# Patient Record
Sex: FEMALE | Race: WHITE | HISPANIC OR LATINO | Employment: UNEMPLOYED | URBAN - METROPOLITAN AREA
[De-identification: names, ages, dates, MRNs, and addresses within clinical notes are randomized per-mention and may not be internally consistent; named-entity substitution may affect disease eponyms.]

---

## 2019-02-23 ENCOUNTER — HOSPITAL ENCOUNTER (EMERGENCY)
Facility: HOSPITAL | Age: 69
Discharge: HOME | End: 2019-02-23
Attending: EMERGENCY MEDICINE
Payer: COMMERCIAL

## 2019-02-23 VITALS
SYSTOLIC BLOOD PRESSURE: 148 MMHG | TEMPERATURE: 98 F | OXYGEN SATURATION: 96 % | DIASTOLIC BLOOD PRESSURE: 65 MMHG | HEIGHT: 60 IN | BODY MASS INDEX: 28.99 KG/M2 | WEIGHT: 147.69 LBS | RESPIRATION RATE: 20 BRPM | HEART RATE: 92 BPM

## 2019-02-23 DIAGNOSIS — R10.9 ABDOMINAL PAIN: ICD-10-CM

## 2019-02-23 DIAGNOSIS — E86.1 INTRAVASCULAR VOLUME DEPLETION: ICD-10-CM

## 2019-02-23 DIAGNOSIS — E87.6 HYPOKALEMIA: ICD-10-CM

## 2019-02-23 DIAGNOSIS — R19.7 DIARRHEA, UNSPECIFIED TYPE: Primary | ICD-10-CM

## 2019-02-23 LAB
ALBUMIN SERPL BCP-MCNC: 4.5 G/DL
ALP SERPL-CCNC: 75 U/L
ALT SERPL W/O P-5'-P-CCNC: 72 U/L
ANION GAP SERPL CALC-SCNC: 16 MMOL/L
AST SERPL-CCNC: 58 U/L
BASOPHILS # BLD AUTO: 0.03 K/UL
BASOPHILS NFR BLD: 0.3 %
BILIRUB SERPL-MCNC: 0.3 MG/DL
BILIRUB UR QL STRIP: NEGATIVE
BUN SERPL-MCNC: 26 MG/DL
CALCIUM SERPL-MCNC: 10.1 MG/DL
CHLORIDE SERPL-SCNC: 98 MMOL/L
CLARITY UR: CLEAR
CO2 SERPL-SCNC: 25 MMOL/L
COLOR UR: YELLOW
CREAT SERPL-MCNC: 1.2 MG/DL
DIFFERENTIAL METHOD: ABNORMAL
EOSINOPHIL # BLD AUTO: 0.1 K/UL
EOSINOPHIL NFR BLD: 1 %
ERYTHROCYTE [DISTWIDTH] IN BLOOD BY AUTOMATED COUNT: 13.1 %
EST. GFR  (AFRICAN AMERICAN): 54 ML/MIN/1.73 M^2
EST. GFR  (NON AFRICAN AMERICAN): 47 ML/MIN/1.73 M^2
GLUCOSE SERPL-MCNC: 191 MG/DL
GLUCOSE UR QL STRIP: NEGATIVE
HCT VFR BLD AUTO: 42 %
HGB BLD-MCNC: 14.7 G/DL
HGB UR QL STRIP: NEGATIVE
KETONES UR QL STRIP: NEGATIVE
LEUKOCYTE ESTERASE UR QL STRIP: NEGATIVE
LIPASE SERPL-CCNC: 60 U/L
LYMPHOCYTES # BLD AUTO: 1.8 K/UL
LYMPHOCYTES NFR BLD: 16.7 %
MAGNESIUM SERPL-MCNC: 2 MG/DL
MCH RBC QN AUTO: 30.9 PG
MCHC RBC AUTO-ENTMCNC: 35 G/DL
MCV RBC AUTO: 88 FL
MONOCYTES # BLD AUTO: 0.3 K/UL
MONOCYTES NFR BLD: 3 %
NEUTROPHILS # BLD AUTO: 8.3 K/UL
NEUTROPHILS NFR BLD: 79 %
NITRITE UR QL STRIP: NEGATIVE
PH UR STRIP: 6 [PH] (ref 5–8)
PLATELET # BLD AUTO: 259 K/UL
PMV BLD AUTO: 10.9 FL
POCT GLUCOSE: 202 MG/DL (ref 70–110)
POTASSIUM SERPL-SCNC: 3.2 MMOL/L
PROT SERPL-MCNC: 8 G/DL
PROT UR QL STRIP: NEGATIVE
RBC # BLD AUTO: 4.75 M/UL
SODIUM SERPL-SCNC: 139 MMOL/L
SP GR UR STRIP: <=1.005 (ref 1–1.03)
URN SPEC COLLECT METH UR: ABNORMAL
UROBILINOGEN UR STRIP-ACNC: NEGATIVE EU/DL
WBC # BLD AUTO: 10.51 K/UL

## 2019-02-23 PROCEDURE — 83690 ASSAY OF LIPASE: CPT

## 2019-02-23 PROCEDURE — 81003 URINALYSIS AUTO W/O SCOPE: CPT

## 2019-02-23 PROCEDURE — 85025 COMPLETE CBC W/AUTO DIFF WBC: CPT

## 2019-02-23 PROCEDURE — 82962 GLUCOSE BLOOD TEST: CPT

## 2019-02-23 PROCEDURE — 93010 ELECTROCARDIOGRAM REPORT: CPT | Mod: ,,, | Performed by: INTERNAL MEDICINE

## 2019-02-23 PROCEDURE — 86803 HEPATITIS C AB TEST: CPT

## 2019-02-23 PROCEDURE — 93010 EKG 12-LEAD: ICD-10-PCS | Mod: ,,, | Performed by: INTERNAL MEDICINE

## 2019-02-23 PROCEDURE — 83735 ASSAY OF MAGNESIUM: CPT

## 2019-02-23 PROCEDURE — 99285 EMERGENCY DEPT VISIT HI MDM: CPT | Mod: 25

## 2019-02-23 PROCEDURE — 93005 ELECTROCARDIOGRAM TRACING: CPT

## 2019-02-23 PROCEDURE — 96360 HYDRATION IV INFUSION INIT: CPT

## 2019-02-23 PROCEDURE — 80053 COMPREHEN METABOLIC PANEL: CPT

## 2019-02-23 PROCEDURE — 25000003 PHARM REV CODE 250: Performed by: EMERGENCY MEDICINE

## 2019-02-23 RX ORDER — METFORMIN HYDROCHLORIDE 1000 MG/1
1000 TABLET ORAL 2 TIMES DAILY WITH MEALS
COMMUNITY

## 2019-02-23 RX ORDER — HYDROCHLOROTHIAZIDE 50 MG/1
50 TABLET ORAL DAILY
COMMUNITY

## 2019-02-23 RX ORDER — CARVEDILOL 6.25 MG/1
6.25 TABLET ORAL 2 TIMES DAILY WITH MEALS
COMMUNITY

## 2019-02-23 RX ORDER — LOPERAMIDE HYDROCHLORIDE 2 MG/1
2 CAPSULE ORAL 4 TIMES DAILY PRN
Qty: 12 CAPSULE | Refills: 0 | Status: SHIPPED | OUTPATIENT
Start: 2019-02-23 | End: 2019-03-05

## 2019-02-23 RX ORDER — AMLODIPINE BESYLATE 10 MG/1
10 TABLET ORAL DAILY
COMMUNITY

## 2019-02-23 RX ADMIN — SODIUM CHLORIDE 1000 ML: 0.9 INJECTION, SOLUTION INTRAVENOUS at 06:02

## 2019-02-23 NOTE — ED PROVIDER NOTES
SCRIBE #1 NOTE: I, Margot Gomez, am scribing for, and in the presence of, Sharmin Hopkins MD. I have scribed the entire note.       History     Chief Complaint   Patient presents with    Abdominal Pain     abdominal and epigastric pain, diarrhea x2 days     Review of patient's allergies indicates:  No Known Allergies      History of Present Illness     HPI    2/23/2019, 5:40 PM  History obtained from the patient's friend who is translating      History of Present Illness: Flower Reynoso is a 68 y.o. female patient with a PMHx of HTN, DM who presents to the Emergency Department for evaluation of sharp, abdominal pain which onset gradually today. The pain is constant and moderate in severity. No mitigating or exacerbating factors reported. Patient has had diarrhea x2 days and states that she has had plenty of episodes. Friend also reports that patient began having BLE pain today. Pt has no c/o fever, chills, N/V, constipation, blood in stool, melena, dysuria, hematuria, dizziness, lightheadedness, and all other sxs at this time.      Arrival mode: Personal vehicle    PCP: [facility]        Past Medical History:  Past Medical History:   Diagnosis Date    Diabetes mellitus     Hypertension        Past Surgical History:  History reviewed. No pertinent surgical history.      Family History:  History reviewed. No pertinent family history.    Social History:  Social History     Tobacco Use    Smoking status: Never Smoker   Substance and Sexual Activity    Alcohol use: Unknown    Drug use: Unknown    Sexual activity: Not on file        Review of Systems     Review of Systems   Constitutional: Negative for chills and fever.   HENT: Negative for sore throat.    Respiratory: Negative for shortness of breath.    Cardiovascular: Negative for chest pain.   Gastrointestinal: Positive for abdominal pain and diarrhea. Negative for anal bleeding, blood in stool, constipation, nausea and vomiting.    Genitourinary: Negative for dysuria.   Musculoskeletal: Positive for myalgias (BLE pain). Negative for back pain.   Skin: Negative for rash.   Neurological: Negative for dizziness, syncope, weakness and light-headedness.   Hematological: Does not bruise/bleed easily.   All other systems reviewed and are negative.     Physical Exam     Initial Vitals [02/23/19 1720]   BP Pulse Resp Temp SpO2   (!) 183/69 109 20 98.4 °F (36.9 °C) 97 %      MAP       --          Physical Exam  Nursing Notes and Vital Signs Reviewed.  Constitutional: Patient is in no acute distress. Well-developed and well-nourished.  Head: Atraumatic. Normocephalic.  Eyes: PERRL. EOM intact. Conjunctivae are not pale. No scleral icterus.  ENT: Mucous membranes are moist. Oropharynx is clear and symmetric.    Neck: Supple. Full ROM. No lymphadenopathy.  Cardiovascular: Regular rate. Regular rhythm. No murmurs, rubs, or gallops. Distal pulses are 2+ and symmetric.  Pulmonary/Chest: No respiratory distress. Clear to auscultation bilaterally. No wheezing or rales.  Abdominal: Soft and non-distended.  There is no tenderness.  No rebound, guarding, or rigidity. Good bowel sounds.  Musculoskeletal: Moves all extremities. No obvious deformities. No edema. No calf tenderness.  Skin: Warm and dry.  Neurological:  Alert, awake, and appropriate.  Normal speech.  No acute focal neurological deficits are appreciated.  Psychiatric: Normal affect. Good eye contact. Appropriate in content.     ED Course   Procedures  ED Vital Signs:  Vitals:    02/23/19 1720 02/23/19 1901 02/23/19 1931   BP: (!) 183/69 (!) 145/67 (!) 148/65   Pulse: 109 88 92   Resp: 20 20 20   Temp: 98.4 °F (36.9 °C)     TempSrc: Oral     SpO2: 97% 95% 96%   Weight: 67 kg (147 lb 11.3 oz)     Height: 5' (1.524 m)         Abnormal Lab Results:  Labs Reviewed   CBC W/ AUTO DIFFERENTIAL - Abnormal; Notable for the following components:       Result Value    Gran # (ANC) 8.3 (*)     Gran% 79.0 (*)      Lymph% 16.7 (*)     Mono% 3.0 (*)     All other components within normal limits   COMPREHENSIVE METABOLIC PANEL - Abnormal; Notable for the following components:    Potassium 3.2 (*)     Glucose 191 (*)     BUN, Bld 26 (*)     AST 58 (*)     ALT 72 (*)     eGFR if  54 (*)     eGFR if non  47 (*)     All other components within normal limits   URINALYSIS, REFLEX TO URINE CULTURE - Abnormal; Notable for the following components:    Specific Gravity, UA <=1.005 (*)     All other components within normal limits    Narrative:     Preferred Collection Type->Urine, Clean Catch   POCT GLUCOSE - Abnormal; Notable for the following components:    POCT Glucose 202 (*)     All other components within normal limits   LIPASE   MAGNESIUM   HEPATITIS C ANTIBODY        All Lab Results:  Results for orders placed or performed during the hospital encounter of 02/23/19   CBC W/ AUTO DIFFERENTIAL   Result Value Ref Range    WBC 10.51 3.90 - 12.70 K/uL    RBC 4.75 4.00 - 5.40 M/uL    Hemoglobin 14.7 12.0 - 16.0 g/dL    Hematocrit 42.0 37.0 - 48.5 %    MCV 88 82 - 98 fL    MCH 30.9 27.0 - 31.0 pg    MCHC 35.0 32.0 - 36.0 g/dL    RDW 13.1 11.5 - 14.5 %    Platelets 259 150 - 350 K/uL    MPV 10.9 9.2 - 12.9 fL    Gran # (ANC) 8.3 (H) 1.8 - 7.7 K/uL    Lymph # 1.8 1.0 - 4.8 K/uL    Mono # 0.3 0.3 - 1.0 K/uL    Eos # 0.1 0.0 - 0.5 K/uL    Baso # 0.03 0.00 - 0.20 K/uL    Gran% 79.0 (H) 38.0 - 73.0 %    Lymph% 16.7 (L) 18.0 - 48.0 %    Mono% 3.0 (L) 4.0 - 15.0 %    Eosinophil% 1.0 0.0 - 8.0 %    Basophil% 0.3 0.0 - 1.9 %    Differential Method Automated    Comp. Metabolic Panel   Result Value Ref Range    Sodium 139 136 - 145 mmol/L    Potassium 3.2 (L) 3.5 - 5.1 mmol/L    Chloride 98 95 - 110 mmol/L    CO2 25 23 - 29 mmol/L    Glucose 191 (H) 70 - 110 mg/dL    BUN, Bld 26 (H) 8 - 23 mg/dL    Creatinine 1.2 0.5 - 1.4 mg/dL    Calcium 10.1 8.7 - 10.5 mg/dL    Total Protein 8.0 6.0 - 8.4 g/dL    Albumin 4.5 3.5 -  5.2 g/dL    Total Bilirubin 0.3 0.1 - 1.0 mg/dL    Alkaline Phosphatase 75 55 - 135 U/L    AST 58 (H) 10 - 40 U/L    ALT 72 (H) 10 - 44 U/L    Anion Gap 16 8 - 16 mmol/L    eGFR if African American 54 (A) >60 mL/min/1.73 m^2    eGFR if non African American 47 (A) >60 mL/min/1.73 m^2   Lipase   Result Value Ref Range    Lipase 60 4 - 60 U/L   Urinalysis, Reflex to Urine Culture Urine, Clean Catch   Result Value Ref Range    Specimen UA Urine, Clean Catch     Color, UA Yellow Yellow, Straw, Leela    Appearance, UA Clear Clear    pH, UA 6.0 5.0 - 8.0    Specific Gravity, UA <=1.005 (A) 1.005 - 1.030    Protein, UA Negative Negative    Glucose, UA Negative Negative    Ketones, UA Negative Negative    Bilirubin (UA) Negative Negative    Occult Blood UA Negative Negative    Nitrite, UA Negative Negative    Urobilinogen, UA Negative <2.0 EU/dL    Leukocytes, UA Negative Negative   Magnesium   Result Value Ref Range    Magnesium 2.0 1.6 - 2.6 mg/dL   POCT glucose   Result Value Ref Range    POCT Glucose 202 (H) 70 - 110 mg/dL       Imaging Results:  Imaging Results          X-Ray Abdomen Flat And Erect (Final result)  Result time 02/23/19 18:16:51    Final result by Facundo Morrissey MD (02/23/19 18:16:51)                 Impression:      No acute abnormality.      Electronically signed by: Facundo Morrissey MD  Date:    02/23/2019  Time:    18:16             Narrative:    EXAMINATION:  XR ABDOMEN FLAT AND ERECT    CLINICAL HISTORY:  Abdominal Pain;    TECHNIQUE:  Two view of the abdomen was performed.    COMPARISON:  None    FINDINGS:  Nonobstructive bowel gas pattern.  Mild constipation.    No obvious free air.  No portal venous gas.    No acute fracture. Mild DJD. Lung bases are clear.                               The EKG was ordered, reviewed, and independently interpreted by the ED provider.  Interpretation time: 17:26  Rate: 105 BPM  Rhythm: sinus tachycardia  Interpretation: RBBB. No STEMI.       The Emergency Provider  reviewed the vital signs and test results, which are outlined above.     ED Discussion     7:31 PM: Reassessed pt at this time. Patient's friend is translating for her. Discussed with pt all pertinent ED information and results. Discussed pt dx and plan to prescribe Imodium for diarrhea. Gave pt all f/u and return to the ED instructions. All questions and concerns were addressed at this time. Pt expresses understanding of information and instructions, and is comfortable with plan to discharge. Pt is stable for discharge.    I discussed with patient and/or family/caretaker that evaluation in the ED does not suggest any emergent or life threatening medical conditions requiring immediate intervention beyond what was provided in the ED, and I believe patient is safe for discharge.  Regardless, an unremarkable evaluation in the ED does not preclude the development or presence of a serious of life threatening condition. As such, patient was instructed to return immediately for any worsening or change in current symptoms.    ED Medication(s):  Medications   sodium chloride 0.9% bolus 1,000 mL (0 mLs Intravenous Stopped 2/23/19 1936)       Discharge Medication List as of 2/23/2019  7:28 PM      START taking these medications    Details   loperamide (IMODIUM) 2 mg capsule Take 1 capsule (2 mg total) by mouth 4 (four) times daily as needed for Diarrhea., Starting Sat 2/23/2019, Until Tue 3/5/2019, Print             Follow-up Information     [facility]. Schedule an appointment as soon as possible for a visit in 2 days.    Why:  Return to the Emergency Room                 Medical Decision Making:   Clinical Tests:   Lab Tests: Reviewed and Ordered  Radiological Study: Ordered and Reviewed  Medical Tests: Ordered and Reviewed         Scribe Attestation:   Scribe #1: I performed the above scribed service and the documentation accurately describes the services I performed. I attest to the accuracy of the note.      Attending:   Physician Attestation Statement for Scribe #1: I, Sharmin Hopkins MD, personally performed the services described in this documentation, as scribed by Margot Gomez, in my presence, and it is both accurate and complete.           Clinical Impression       ICD-10-CM ICD-9-CM   1. Diarrhea, unspecified type R19.7 787.91   2. Abdominal pain R10.9 789.00   3. Intravascular volume depletion E86.1 276.52   4. Hypokalemia E87.6 276.8       Disposition:   Disposition: Discharged  Condition: Stable         Sharmin Hopkins MD  02/24/19 1037

## 2019-02-23 NOTE — ED NOTES
Green, gold, purple, and blue top blood tubes drawn and sent to the lab along with red top for HCV screen.

## 2019-02-25 LAB — HCV AB SERPL QL IA: NEGATIVE

## 2025-06-17 ENCOUNTER — HOSPITAL ENCOUNTER (EMERGENCY)
Facility: HOSPITAL | Age: 75
Discharge: LEFT BEFORE DISCHARGE | End: 2025-06-17
Payer: MEDICAID

## 2025-06-17 VITALS
HEART RATE: 74 BPM | OXYGEN SATURATION: 96 % | WEIGHT: 201.38 LBS | SYSTOLIC BLOOD PRESSURE: 215 MMHG | RESPIRATION RATE: 18 BRPM | TEMPERATURE: 98 F | BODY MASS INDEX: 39.33 KG/M2 | DIASTOLIC BLOOD PRESSURE: 86 MMHG

## 2025-06-17 PROCEDURE — 99900041 HC LEFT WITHOUT BEING SEEN- EMERGENCY
